# Patient Record
Sex: FEMALE | Race: WHITE
[De-identification: names, ages, dates, MRNs, and addresses within clinical notes are randomized per-mention and may not be internally consistent; named-entity substitution may affect disease eponyms.]

---

## 2019-09-16 ENCOUNTER — HOSPITAL ENCOUNTER (OUTPATIENT)
Dept: HOSPITAL 62 - RAD | Age: 69
End: 2019-09-16
Attending: NURSE PRACTITIONER
Payer: MEDICARE

## 2019-09-16 DIAGNOSIS — R20.0: Primary | ICD-10-CM

## 2019-09-16 PROCEDURE — 72148 MRI LUMBAR SPINE W/O DYE: CPT

## 2019-09-16 NOTE — RADIOLOGY REPORT (SQ)
EXAM DESCRIPTION:  MRI LUMBAR SPINE WITHOUT



COMPLETED DATE/TIME:  9/16/2019 8:04 am



REASON FOR STUDY:  NUMBNESS OF TOES R20.0  ANESTHESIA OF SKIN



COMPARISON:  None.



TECHNIQUE:  Sagittal and Axial imaging includes T1, T2, STIR and gradient echo sequences. Coronal T2/
HASTE imaging.



LIMITATIONS:  None.



FINDINGS:  VISUALIZED UPPER ABDOMEN:  Limited evaluation. No acute or suspicious findings suggested.

SEGMENTATION: There appears to be lumbarization of S1.  Consider radiographs to confirm this.

ALIGNMENT: There is grade 1 anterolisthesis of L4 on L5 and of L5 on S1.

VERTEBRAE: Intact.

BONE MARROW: The bone marrow is somewhat heterogeneous at L4 and L5.

DISC SIGNAL: There is disc narrowing at L4-5 and L5-S1.  The discs demonstrate decreased signal inten
sity.

POSTERIOR ELEMENTS:  Generally intact.  No pars defect evident.

HARDWARE: None in the spine.

CORD AND CONUS: Normal in size and signal intensity. Conus at the L1-2 level.

SOFT TISSUES: No aortic aneurysm seen. No bulky retroperitoneal adenopathy or mass. No paraspinal mas
s or fluid.

L1-L2: No significant spinal stenosis or exit foraminal stenosis.

L2-L3: No significant spinal stenosis or exit foraminal stenosis.

L3-L4: Mild concentric disc bulging with no central canal or foraminal stenosis.

L4-L5: There is unroofing of the disc secondary to the anterolisthesis.  There is shallow broad-based
 disc bulge.  Mild facet and ligament hypertrophy.  These findings result in mild central canal steno
sis.  There is no significant foraminal stenosis.

L5-S1: No significant spinal stenosis or exit foraminal stenosis.

LOWER THORACIC: Incompletely imaged.  No stenosis seen.

SACRUM: Visualized upper sacrum intact.

OTHER: No other significant findings.



IMPRESSION:  1.  There is grade 1 anterolisthesis of L4 on L5 an there is grade 1 anterolisthesis of 
L5 on S1.

2.  S1 may represent transitional vertebra.  Consider radiographs.

3.  The marrow signal is heterogeneous at L4 and L5 and in the ilium and sacrum.  Is there a history 
of neoplasm?  Cannot entirely exclude metastases.

4.  There is mild central canal stenosis at L4-5 secondary to the anterolisthesis and shallow broad-b
ased disc bulge.  There is no foraminal stenosis.



TECHNICAL DOCUMENTATION:  JOB ID:  [id number]

 [employer]- All Rights Reserved



Reading location - IP/workstation name: [email or web address]

## 2019-10-07 ENCOUNTER — HOSPITAL ENCOUNTER (OUTPATIENT)
Dept: HOSPITAL 62 - RAD | Age: 69
End: 2019-10-07
Attending: INTERNAL MEDICINE
Payer: MEDICARE

## 2019-10-07 DIAGNOSIS — R22.43: ICD-10-CM

## 2019-10-07 DIAGNOSIS — D47.2: Primary | ICD-10-CM

## 2019-10-07 PROCEDURE — 77075 RADEX OSSEOUS SURVEY COMPL: CPT

## 2019-10-07 NOTE — RADIOLOGY REPORT (SQ)
EXAM DESCRIPTION:  BONE SURVEY COMPLETE



COMPLETED DATE/TIME:  10/7/2019 3:23 pm



REASON FOR STUDY:  D49.2 NEOPLASM OF UNSP BEHAVIOR OF BONE, SOFT TISSUE, AND SKIN D49.2  NEOPLASM OF 
UNSP BEHAVIOR OF BONE, SOFT TISSUE, AND SK D47.2  MONOCLONAL GAMMOPATHY R22.43  LOCALIZED SWELLING, M
ASS AND LUMP, LOWER LIMB, BILATE



COMPARISON:  None.



TECHNIQUE:  Images of the axial and proximal appendicular skeleton are obtained, along with lateral s
kull and frontal chest films.



LIMITATIONS:  None.



FINDINGS:  AP CHEST: No bony findings.  Lungs are clear.

LATERAL SKULL: There is a small lytic lesion in the parietal region of the calvarium.

AP BOTH HUMERI: No worrisome bone lesions.

TWO-VIEW LUMBAR SPINE: No worrisome bone lesions.  Anterolistheses of what appears to be L3 on L4 and
 of L4 on L5.

TWO-VIEW THORACIC SPINE: No worrisome bone lesions.

AP PELVIS: No worrisome bone lesions.

AP BOTH FEMURS: No worrisome bone lesions.

OTHER: No other significant finding.



IMPRESSION:  There is a small lytic lesion in the parietal calvarium.  This is seen on both views, bu
t is felt to represent a single lesion.  No other significant findings.



TECHNICAL DOCUMENTATION:  JOB ID:  [id number]

 2011 [employer]- All Rights Reserved



Reading location - IP/workstation name: [email or web address]

## 2019-10-09 ENCOUNTER — HOSPITAL ENCOUNTER (OUTPATIENT)
Dept: HOSPITAL 62 - RAD | Age: 69
End: 2019-10-09
Attending: INTERNAL MEDICINE
Payer: MEDICARE

## 2019-10-09 DIAGNOSIS — D47.2: ICD-10-CM

## 2019-10-09 DIAGNOSIS — M79.89: Primary | ICD-10-CM

## 2019-10-09 DIAGNOSIS — R22.43: ICD-10-CM

## 2019-10-09 PROCEDURE — 93970 EXTREMITY STUDY: CPT

## 2019-10-09 PROCEDURE — A9561 TC99M OXIDRONATE: HCPCS

## 2019-10-09 PROCEDURE — 78306 BONE IMAGING WHOLE BODY: CPT

## 2019-10-09 NOTE — RADIOLOGY REPORT (SQ)
EXAM DESCRIPTION:  NM WHOLE BODY BONE SCAN



COMPLETED DATE/TIME:  10/9/2019 12:50 pm



REASON FOR STUDY:  OTHER SPECIFIED SOFT TISSUE  DISORDERS D47.2 MONOCLONAL GAMMOPATHY M79.89  OTHER S
PECIFIED SOFT TISSUE DISORDERS D49.2  NEOPLASM OF UNSP BEHAVIOR OF BONE, SOFT TISSUE, AND SK R22.43  
LOCALIZED SWELLING, MASS AND LUMP, LOWER LIMB, BILATE



COMPARISON:  Bone survey 10/7/2019 MR lumbar spine 9/16/2019



RADIONUCLIDE AND DOSE:  20 millicuries Tc99m HDP.

The route of agent administration: Intravenous.



ADDITIONAL DRUGS AND DOSES:  None.



TECHNIQUE:  Routine delayed images at 3 hours post radionuclide injection acquired of the bony skelet
on including anterior and posterior whole-body projections and additional focused images as needed.



LIMITATIONS:  None.



FINDINGS:  BONES: There is mild increased uptake in the left side of the mid cervical spine and in th
e region of the facet joints in the lower lumbar spine felt to be degenerative.  No other significant
 abnormal uptake is seen.

KIDNEYS: Symmetric excretion without obstruction.

OTHER: No other significant finding.



IMPRESSION:  Mild degenerative uptake.  No evidence of skeletal metastases.



COMMENT:  Quality measure 147:  Current bone scan is compared with any available plain radiographs, p
rior bone scans, and CT/MRI.



TECHNICAL DOCUMENTATION:  JOB ID:  [id number]

 2011 [employer]- All Rights Reserved



Reading location - IP/workstation name: [email or web address]

## 2019-10-09 NOTE — XCELERA REPORT
[address]

                             [facility] [address]

                               Tel: [phone]

                               Fax: [phone]



                       Lower Extremity Venous Evaluation

_______________________________________________________________________________



_______________________________________________________________________________

Procedure: Color flow and duplex imaging bilaterally of the veins of the lower

extremities as well as the Common Femoral veins.





_______________________________________________________________________________



_______________________________________________________________________________



Right Sided Venous Evaluation

Normal vessel filling wall to wall, compression and augmentation as well as

Colour flow down to the infrageniculate veins.



Left Sided Venous Evaluation

Normal vessel filling wall to wall, compression and augmentation as well as

Colour flow down to the infrageniculate veins.



_______________________________________________________________________________



Interpretation Summary

No duplex evidence of DVT or obstruction in the bilateral lower extremities.

_______________________________________________________________________________



Name: MARCY FOOTE

MRN: [id number]

Age: 68 yrs

Gender: Female                                       : 1950

Patient Status: Preadmit                             Patient Location: [facility]

Account #: [id number]

Study Date: 10/09/2019 01:29 PM

                        Accession #: [id number]

Reason For Study: SWELLING

Ordering Physician: DUDLEY PEREZ

Performed By: Marbella Emerson

Electronically signed by:      Lennox Williams      on 10/09/2019 04:16 PM



CC: DUDLEY PEREZ

>

Williams, Lennox

## 2019-10-11 ENCOUNTER — HOSPITAL ENCOUNTER (OUTPATIENT)
Dept: HOSPITAL 62 - RAD | Age: 69
End: 2019-10-11
Attending: INTERNAL MEDICINE
Payer: MEDICARE

## 2019-10-11 DIAGNOSIS — Z87.891: ICD-10-CM

## 2019-10-11 DIAGNOSIS — Z12.2: Primary | ICD-10-CM

## 2019-10-11 DIAGNOSIS — R91.8: ICD-10-CM

## 2019-10-11 PROCEDURE — G0297 LDCT FOR LUNG CA SCREEN: HCPCS

## 2019-10-11 NOTE — RADIOLOGY REPORT (SQ)
EXAM DESCRIPTION:  CT LUNG CANCER SCREENING



COMPLETED DATE/TIME:  10/11/2019 8:17 am



REASON FOR STUDY:  (Z87.891)PERSONAL HISTORY OF NICOTINE DEPENDENCE Z87.891  PERSONAL HISTORY OF FLOR
ESME DEPENDENCE

Low-dose CT lung cancer screening.  Please see technologist documentation for complete screening info
rmation.



COMPARISON:  None.



TECHNIQUE:  Low Dose CT scan performed of the chest without intravenous contrast for purposes of scre
ening for lung cancer.  Images reviewed with lung, soft tissue and bone windows.  Reconstructed coron
al and sagittal MPR images reviewed.  All images stored on PACS.

All CT scanners at this facility use dose modulation, iterative reconstruction, and/or weight based d
osing when appropriate to reduce radiation dose to as low as reasonably achievable (ALARA).

[facility]: Dose Right  [facility]: CareDose    [facility]: Dose Right    [facility]: Teradose 4D    [facility]: Smart MemSQL



RADIATION DOSE:  CT Rad equipment meets quality standard of care and radiation dose reduction techniq
ues were employed. CTDIvol: NaN mGy. DLP: 0 mGy-cm.  mGy.

 .



LIMITATIONS:  None



FINDINGS:  LUNGS AND PLEURA: Occasional tiny nodules, for example a 2 mm nodule in the left lower lob
e (series 3, image 122).  No pleural effusions or calcifications.  No pneumothorax.   No scarring or 
interstitial changes.

HILAR AND MEDIASTINAL STRUCTURES: No identified masses. No abnormal nodes.

HEART AND VASCULAR STRUCTURES: No aortic aneurysm.  No pericardial effusion.   No cardiac devices.

CORONARY ARTERY CALCIFICATIONS: No significant calcifications.

UPPER ABDOMEN, THYROID, BONES, OTHER SOFT TISSUES: No significant findings.



IMPRESSION:  No suspicious pulmonary nodules.  Recommend return to annual CT screening.



LUNGRADS:  LUNGRADS:  2 BENIGN APPEARANCE OR BEHAVIOR.

MODIFIER: NONE



RECOMMENDATION:  Continue annual screening with LDCT in 12 months.



COMMENT:  CRITERIA:

Nodules smaller than 6 mm in size at baseline.

Nodules with specific calcifications:  Complete, central, popcorn, concentric rings and fat containin
g nodules.



TECHNICAL DOCUMENTATION:  JOB ID:  [id number]

Quality ID # 436: Final reports with documentation of one or more dose reduction techniques (e.g., Au
tomated exposure control, adjustment of the mA and/or kV according to patient size, use of iterative 
reconstruction technique)

 2011 [facility]



Reading location - IP/workstation name: [email or web address]

## 2019-11-20 ENCOUNTER — HOSPITAL ENCOUNTER (OUTPATIENT)
Dept: HOSPITAL 62 - RAD | Age: 69
Discharge: HOME | End: 2019-11-20
Attending: INTERNAL MEDICINE
Payer: MEDICARE

## 2019-11-20 VITALS — DIASTOLIC BLOOD PRESSURE: 68 MMHG | SYSTOLIC BLOOD PRESSURE: 107 MMHG

## 2019-11-20 DIAGNOSIS — D47.2: Primary | ICD-10-CM

## 2019-11-20 DIAGNOSIS — D64.9: ICD-10-CM

## 2019-11-20 LAB
APTT BLD: 26.6 SEC (ref 23.5–35.8)
BUN SERPL-MCNC: 20 MG/DL (ref 7–20)
ERYTHROCYTE [DISTWIDTH] IN BLOOD BY AUTOMATED COUNT: 13.2 % (ref 11.5–14)
HCT VFR BLD CALC: 39.1 % (ref 36–47)
HGB BLD-MCNC: 13.7 G/DL (ref 12–15.5)
INR PPP: 1
MCH RBC QN AUTO: 31.9 PG (ref 27–33.4)
MCHC RBC AUTO-ENTMCNC: 35 G/DL (ref 32–36)
MCV RBC AUTO: 91 FL (ref 80–97)
PLATELET # BLD: 148 10^3/UL (ref 150–450)
PROTHROMBIN TIME: 13.2 SEC (ref 11.4–15.4)
RBC # BLD AUTO: 4.29 10^6/UL (ref 3.72–5.28)
WBC # BLD AUTO: 3.8 10^3/UL (ref 4–10.5)

## 2019-11-20 PROCEDURE — 84520 ASSAY OF UREA NITROGEN: CPT

## 2019-11-20 PROCEDURE — 85610 PROTHROMBIN TIME: CPT

## 2019-11-20 PROCEDURE — 85730 THROMBOPLASTIN TIME PARTIAL: CPT

## 2019-11-20 PROCEDURE — 36415 COLL VENOUS BLD VENIPUNCTURE: CPT

## 2019-11-20 PROCEDURE — 85027 COMPLETE CBC AUTOMATED: CPT

## 2019-11-20 PROCEDURE — 82565 ASSAY OF CREATININE: CPT

## 2019-11-20 PROCEDURE — 38221 DX BONE MARROW BIOPSIES: CPT

## 2019-11-20 PROCEDURE — 77012 CT SCAN FOR NEEDLE BIOPSY: CPT

## 2019-11-20 NOTE — RADIOLOGY REPORT (SQ)
EXAM DESCRIPTION:  CT BIOPSY BONE MARROW, NEEDLE; CT NEEDLE PLACEMENT



COMPLETED DATE/TIME:  11/20/2019 11:24 am



REASON FOR STUDY:  MONOCLONAL GAMMOPATHY/ ANEMIA, UNSPECIFIED D47.2  MONOCLONAL GAMMOPATHY D64.9  ANE
LENCHO, UNSPECIFIED



COMPARISON:  None.



TECHNIQUE:  The procedure, risks, benefits, and alternatives were discussed with the patient in the p
reprocedural area, and all questions were answered. Informed consent was obtained verbally and in wri
ting.

The patient was then brought to the CT suite, positioned prone on the CT gurney, and a time-out was p
erformed. After that, axial images of the pelvis were obtained for targeting of the iliac tuberosity.
 Based on review of the axial images an appropriate access site was selected on the left buttock.

The area around the selected access site was then prepped and draped 2% chlorhexidine utilizing stand
chang sterile technique. After that, the access site was infiltrated 1% lidocaine and a skin incision w
as made with a #11 blade. An 11 gauge Osteo-Site Bishop coaxial needle was then advanced into the alessandro
ac tuberosity. After that, the inner stylet of the coaxial needle was replaced for a 13 gauge biopsy 
needle and 1 core sample was obtained. 20 mL of marrow aspirate were then collected through the cannu
la of the coaxial needle. Once the aspirate was collected, the cannula of the Osteo-Site Bishop coaxi
al needle was removed and axial images of the biopsied area were obtained to exclude an acute complic
ation.

The patient tolerated the procedure well without immediate complication.

At the end of the procedure the patient's condition was unchanged from the preprocedural baseline.

IV conscious sedation was administered at the direction of the performing physician by a dustin payan. 1 milligrams of Versed and 50 micrograms of fentanyl. Physiologic monitoring was provided befor
e, during, and after sedation. The total sedation time was 30 minutes.

Documentation of face-to-face time performing proceduralist spent monitoring the patient: 15 minutes.




RADIATION DOSE:  .06 mGy cm.



FINDINGS:  Integrated into Technique.



IMPRESSION:  CT GUIDED ASPIRATE AND CORE BIOPSY OF THE LEFT POSTERIOR ILIAC CREST BONE MARROW PERFORM
ED WITHOUT IMMEDIATE COMPLICATION.  PATHOLOGY PENDING.

IV CONSCIOUS SEDATION WITHOUT COMPLICATION.



COMMENT:  Quality :  Final reports for procedures using fluoroscopy that document radiation exp
osure indices, or exposure time and number of fluorographic images (if radiation exposure indices are
 not available)

Patient medication list reviewed: Yes- Quality ID# 130:Eligible professional attests to documenting i
n the medical record they obtained, updated, or reviewed the patient's current medications..



TECHNICAL DOCUMENTATION:  JOB ID:  [id number]

Quality ID# 436: Final reports with documentation of one or more dose reduction techniques (e.g., Aut
omated exposure control, adjustment of the mA and/or kV according to patient size, use of iterative r
econstruction technique)

 2011 [employer]- All Rights Reserved



Reading location - IP/workstation name: [email or web address]

## 2019-11-20 NOTE — RADIOLOGY REPORT (SQ)
EXAM DESCRIPTION:  CT BIOPSY BONE MARROW, NEEDLE; CT NEEDLE PLACEMENT



COMPLETED DATE/TIME:  11/20/2019 11:24 am



REASON FOR STUDY:  MONOCLONAL GAMMOPATHY/ ANEMIA, UNSPECIFIED D47.2  MONOCLONAL GAMMOPATHY D64.9  ANE
LENCHO, UNSPECIFIED



COMPARISON:  None.



TECHNIQUE:  The procedure, risks, benefits, and alternatives were discussed with the patient in the p
reprocedural area, and all questions were answered. Informed consent was obtained verbally and in wri
ting.

The patient was then brought to the CT suite, positioned prone on the CT gurney, and a time-out was p
erformed. After that, axial images of the pelvis were obtained for targeting of the iliac tuberosity.
 Based on review of the axial images an appropriate access site was selected on the left buttock.

The area around the selected access site was then prepped and draped 2% chlorhexidine utilizing stand
chang sterile technique. After that, the access site was infiltrated 1% lidocaine and a skin incision w
as made with a #11 blade. An 11 gauge Osteo-Site Bishop coaxial needle was then advanced into the alessandro
ac tuberosity. After that, the inner stylet of the coaxial needle was replaced for a 13 gauge biopsy 
needle and 1 core sample was obtained. 20 mL of marrow aspirate were then collected through the cannu
la of the coaxial needle. Once the aspirate was collected, the cannula of the Osteo-Site Bishop coaxi
al needle was removed and axial images of the biopsied area were obtained to exclude an acute complic
ation.

The patient tolerated the procedure well without immediate complication.

At the end of the procedure the patient's condition was unchanged from the preprocedural baseline.

IV conscious sedation was administered at the direction of the performing physician by a dustin payan. 1 milligrams of Versed and 50 micrograms of fentanyl. Physiologic monitoring was provided befor
e, during, and after sedation. The total sedation time was 30 minutes.

Documentation of face-to-face time performing proceduralist spent monitoring the patient: 15 minutes.




RADIATION DOSE:  .06 mGy cm.



FINDINGS:  Integrated into Technique.



IMPRESSION:  CT GUIDED ASPIRATE AND CORE BIOPSY OF THE LEFT POSTERIOR ILIAC CREST BONE MARROW PERFORM
ED WITHOUT IMMEDIATE COMPLICATION.  PATHOLOGY PENDING.

IV CONSCIOUS SEDATION WITHOUT COMPLICATION.



COMMENT:  Quality :  Final reports for procedures using fluoroscopy that document radiation exp
osure indices, or exposure time and number of fluorographic images (if radiation exposure indices are
 not available)

Patient medication list reviewed: Yes- Quality ID# 130:Eligible professional attests to documenting i
n the medical record they obtained, updated, or reviewed the patient's current medications..



TECHNICAL DOCUMENTATION:  JOB ID:  [id number]

Quality ID# 436: Final reports with documentation of one or more dose reduction techniques (e.g., Aut
omated exposure control, adjustment of the mA and/or kV according to patient size, use of iterative r
econstruction technique)

 2011 [employer]- All Rights Reserved



Reading location - IP/workstation name: [email or web address]

## 2020-01-23 ENCOUNTER — HOSPITAL ENCOUNTER (EMERGENCY)
Dept: HOSPITAL 62 - ER | Age: 70
Discharge: HOME | End: 2020-01-23
Payer: MEDICARE

## 2020-01-23 VITALS — SYSTOLIC BLOOD PRESSURE: 115 MMHG | DIASTOLIC BLOOD PRESSURE: 72 MMHG

## 2020-01-23 DIAGNOSIS — F17.200: ICD-10-CM

## 2020-01-23 DIAGNOSIS — M54.2: ICD-10-CM

## 2020-01-23 DIAGNOSIS — M47.9: Primary | ICD-10-CM

## 2020-01-23 DIAGNOSIS — R20.2: ICD-10-CM

## 2020-01-23 DIAGNOSIS — R51: ICD-10-CM

## 2020-01-23 PROCEDURE — 72125 CT NECK SPINE W/O DYE: CPT

## 2020-01-23 PROCEDURE — S0119 ONDANSETRON 4 MG: HCPCS

## 2020-01-23 PROCEDURE — 99283 EMERGENCY DEPT VISIT LOW MDM: CPT

## 2020-01-23 NOTE — ER DOCUMENT REPORT
HPI





- HPI


Time Seen by Provider: 01/23/20 03:36


Pain Level: 5


Context: 


Patient is a 69-year-old female that comes emergency department for chief 

complaint of neck pain.  Pain is worse on the right side of the neck.  She 

states she has gotten a vague headache at times at the day as well but initially

it started as tightness and pain in the neck along.  She states that she has had

worsening symptoms for the past 2 days, she states yesterday she did help her 

 get up after he had fallen and she did have to pull on him somewhat 

hard.  She denies impact injury.  She states that intermittently she will get 

tingling sensation in her right arm but this is common and has been going on for

a long time.  She denies any numbness, weakness, chest pain, shortness of 

breath, fever, current headache, nausea or vomiting.  She is not on a blood 

thinner.  She denies taking any daily medications or having any diagnosed 

medical history.








Past Medical History





- General


Information source: Patient





- Social History


Smoking Status: Current Every Day Smoker


Lives with: Family


Family History: Reviewed & Not Pertinent


Patient has suicidal ideation: No


Patient has homicidal ideation: No





- Past Medical History


Cardiac Medical History: 


   Denies: Hx Coronary Artery Disease, Hx Heart Attack, Hx Hypertension


Pulmonary Medical History: 


   Denies: Hx Asthma, Hx Bronchitis, Hx COPD, Hx Pneumonia


Neurological Medical History: Denies: Hx Cerebrovascular Accident, Hx Seizures


Musculoskeletal Medical History: Denies Hx Arthritis - PT STATES HAS LOST 

FLEXIBILITY IN LEGS





- Immunizations


Hx Diphtheria, Pertussis, Tetanus Vaccination: Yes





Vertical Provider Document





- CONSTITUTIONAL


General Appearance: WD/WN, No Apparent Distress





- INFECTION CONTROL


TRAVEL OUTSIDE OF THE [place] IN LAST 30 DAYS: No





- HEENT


HEENT: Atraumatic, Normal ENT Exam, Normocephalic





- NECK


Neck: Normal Inspection





- RESPIRATORY


Respiratory: Breath Sounds Normal, No Respiratory Distress





- CARDIOVASCULAR


Cardiovascular: Regular Rate, Regular Rhythm





- GI/ABDOMEN


Gastrointestinal: Abdomen Soft, Abdomen Non-Tender





- BACK


Back: negative: Normal Inspection - Patient with very limited range of motion of

the neck laterally.  She has bunched up muscle fibers in the paracervical area 

at the base of the skull on the right.  This is very specific and reproducible. 

No midline tenderness, no saddle anesthesia, no signs of trauma. Normal upper 

and lower extremity range of motion, normal strength, normal distal 

neurovascular exam.





- MUSCULOSKELETAL/EXTREMETIES


Musculoskeletal/Extremeties: MJ, FROM, Non-Tender





- NEURO


Level of Consciousness: Awake, Alert, Appropriate


Motor/Sensory: No Motor Deficit, No Sensory Deficit





- DERM


Integumentary: Warm, Dry, No Rash





Course





- Re-evaluation


Re-evalutation: 


CAT scan imaging of the neck shows degenerative changes without acute findings. 

Patient has no neurological deficits.  She has no current headache.  She has no 

trauma.  She did have a pulling injury which likely is exacerbated what appears 

to be muscle spasm in the neck.  Patient has very limited range of motion 

laterally of the neck and has point tenderness over the muscle spasm.  Patient 

states she cannot even sleep because of the tightness and she is miserable.  As 

result she was provided with low-dose diazepam to help treat this, discussed 

precautions with this because of her age, discussed close follow-up with primary

care and return precautions, provided with copy of her report on request.  

Patient is here with her .  They state appreciation and agreement.  

Stable at time of discharge.





- Vital Signs


Vital signs: 


                                        











Temp Pulse Resp BP Pulse Ox


 


 97.9 F   66   18   119/77   96 


 


 01/23/20 00:39  01/23/20 00:39  01/23/20 00:39  01/23/20 00:39  01/23/20 00:39














Discharge





- Discharge


Clinical Impression: 


 Neck pain





Condition: Stable


Disposition: HOME, SELF-CARE


Additional Instructions: 


Your evaluation is consistent with a muscle spasm.  Your imaging shows 

degenerative changes in the neck but no obvious concerning acute findings.  I 

recommend heat to the area, gentle stretches, you can take over-the-counter 

medication for pain, I also recommend the prescribed muscle relaxer.  You can 

take 1/2 a tablet to start with, you can proceed to full dose if well tolerated.

 Do not drive, drink alcohol, mix with sedating medication while taking this. 





Please follow-up closely with your primary care provider for additional 

management.





Come back if you are worse including developing numbness, severe headache, 

vomiting, or any other concerning or worsening symptoms.


Prescriptions: 


Diazepam [Valium 5 mg Tablet] 0.5 - 1 tab PO TID PRN #12 tablet


 PRN Reason: 


Referrals: 


BANDAR ANGULO, NP [Primary Care Provider] - Follow up as needed

## 2020-01-23 NOTE — RADIOLOGY REPORT (SQ)
EXAM DESCRIPTION: 



CT CERVICAL SPINE WITHOUT IV CONTRAST



COMPLETED DATE/TME:  01/23/2020 03:56



CLINICAL HISTORY: neck pain, right hand numbness, can't rotate

neck



COMPARISON: None available



TECHNIQUE: Axial CT of the cervical spine obtained without

contrast.



FINDINGS: 

Straightening of the cervical lordosis may be secondary to

patient positioning.  The atlantoaxial, atlantodental, and

occipitoatlantal intervals are preserved.  No fracture

identified. Vertebral body height preserved.  Prevertebral soft

tissues are unremarkable.



Multilevel loss of intervertebral disc height with endplate

spondylosis, cerebellum, and uncovertebral spurring.



Visualized skull base is intact.  No fracture of the visualized

facial bones. Visualized mastoid air cells and paranasal sinuses

are well aerated.



Visualized thyroid is unremarkable.  No cervical lymphadenopathy.

No pneumothorax in the visualized lung apices. Atherosclerotic

calcification







IMPRESSION:

1.  No acute fracture or subluxation of the cervical spine.



2.  Moderate to severe multilevel degenerative change of the

cervical spine.



This exam was performed according to our departmental

dose-optimization program, which includes automated exposure

control, adjustment of the mA and/or kV according to patient size

and/or use of iterative reconstruction technique.

## 2020-07-09 ENCOUNTER — HOSPITAL ENCOUNTER (EMERGENCY)
Dept: HOSPITAL 62 - ER | Age: 70
Discharge: HOME | End: 2020-07-09
Payer: MEDICARE

## 2020-07-09 VITALS — SYSTOLIC BLOOD PRESSURE: 135 MMHG | DIASTOLIC BLOOD PRESSURE: 68 MMHG
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
## 2020-07-09 DIAGNOSIS — S62.101A: Primary | ICD-10-CM

## 2020-07-09 DIAGNOSIS — M79.601: ICD-10-CM

## 2020-07-09 DIAGNOSIS — R20.0: ICD-10-CM

## 2020-07-09 PROCEDURE — 2W3CX1Z IMMOBILIZATION OF RIGHT LOWER ARM USING SPLINT: ICD-10-PCS

## 2020-07-09 PROCEDURE — 73130 X-RAY EXAM OF HAND: CPT

## 2020-07-09 PROCEDURE — 29125 APPL SHORT ARM SPLINT STATIC: CPT

## 2020-07-09 PROCEDURE — 96374 THER/PROPH/DIAG INJ IV PUSH: CPT

## 2020-07-09 PROCEDURE — 73060 X-RAY EXAM OF HUMERUS: CPT

## 2020-07-09 PROCEDURE — 99283 EMERGENCY DEPT VISIT LOW MDM: CPT

## 2020-07-09 PROCEDURE — 96375 TX/PRO/DX INJ NEW DRUG ADDON: CPT

## 2020-07-09 PROCEDURE — 73090 X-RAY EXAM OF FOREARM: CPT

## 2020-07-09 NOTE — RADIOLOGY REPORT (SQ)
EXAM DESCRIPTION:  FOREARM RIGHT



IMAGES COMPLETED DATE/TIME:  7/9/2020 5:08 pm



REASON FOR STUDY:  right arm pain



COMPARISON:  None.



NUMBER OF VIEWS:  Two views.



TECHNIQUE:  Two radiographic images acquired of the right forearm, including elbow and wrist in at le
ast one projection.



LIMITATIONS:  None.



FINDINGS:  MINERALIZATION: Normal.

BONES: Acute comminuted intra-articular fracture distal right radius metaphysis with dorsal subluxati
on of distal fracture fragments.  Dorsal angulation of the right distal radius articular surface.

Carpal bones, proximal metacarpals, distal ulna intact.

Limited view of the distal humerus in the field of view, remainder the radius and ulna proximally are
 intact.

SOFT TISSUES: Diffuse right wrist soft tissue swelling

OTHER: No other significant finding.



IMPRESSION:  Acute comminuted intra-articular fracture distal right radius metaphysis with dorsal sub
luxation and angulation of distal fracture fragments.



TECHNICAL DOCUMENTATION:  JOB ID:  [id number]

 2011 [employer]- All Rights Reserved



Reading location - IP/workstation name: [email or web address]

## 2020-07-09 NOTE — RADIOLOGY REPORT (SQ)
EXAM DESCRIPTION:  HUMERUS RIGHT



IMAGES COMPLETED DATE/TIME:  7/9/2020 5:25 pm



REASON FOR STUDY:  pain



COMPARISON:  Right forearm two views same date



NUMBER OF VIEWS:  Two views.



TECHNIQUE:  Two radiographic images were acquired of the right humerus to include elbow and shoulder 
in at least one projection.



LIMITATIONS:  None.



FINDINGS:  MINERALIZATION: Osteopenic

BONES: No acute fracture or dislocation.  No worrisome bone lesions.

SOFT TISSUES: No obvious swelling or foreign body.

OTHER: No other significant finding.



IMPRESSION:  NEGATIVE STUDY OF THE RIGHT HUMERUS. NO RADIOGRAPHIC EVIDENCE OF ACUTE INJURY.



TECHNICAL DOCUMENTATION:  JOB ID:  [id number]

 2011 [employer]- All Rights Reserved



Reading location - IP/workstation name: [id number]

## 2020-07-09 NOTE — RADIOLOGY REPORT (SQ)
EXAM DESCRIPTION:  HAND RIGHT 3 VIEWS



IMAGES COMPLETED DATE/TIME:  7/9/2020 5:08 pm



REASON FOR STUDY:  right arm pain



COMPARISON:  Right forearm two views same



EXAM PARAMETERS:  NUMBER OF VIEWS: Three views.

TECHNIQUE: AP, lateral and oblique  radiographic images acquired of the right hand.

LIMITATIONS: None.



FINDINGS:  MINERALIZATION: Normal.

BONES: Acute comminuted intra-articular fracture distal right radius metaphysis with significant dors
al subluxation and angulation of distal fracture fragments.

No acute distal ulna or carpal bone/proximal metacarpal fracture.  Remainder of the bones of the righ
t hand are intact.

JOINTS: Diffuse wrist joint effusions.

SOFT TISSUES: Diffuse wrist soft tissue swelling.  No foreign body.

OTHER: No other significant finding.



IMPRESSION:  Acute comminuted intra-articular fracture, distal right radius metaphysis with significa
nt dorsal subluxation and angulation at the fracture site



TECHNICAL DOCUMENTATION:  JOB ID:  [id number]

 2011 [employer]- All Rights Reserved



Reading location - IP/workstation name: [id number]

## 2020-07-09 NOTE — ER DOCUMENT REPORT
ED General





- General


Chief Complaint: Wrist Injury


Stated Complaint: RIGHT ARM PAIN


Time Seen by Provider: 07/09/20 16:51


Primary Care Provider: 


BANDAR ANGULO NP [Primary Care Provider] - Follow up as needed


Mode of Arrival: Ambulatory


Information source: Patient


Notes: 


69-year-old white female arrives with chief complaint of right wrist pain after 

he tripped and fell striking a stove deforming her right distal wrist.  Patient 

also injured her right proximal shoulder when this occurred.  She denies any 

other injury.  She reports prior fracture to her right forearm when she was in 

the sixth grade which took 2 months to heal in [place].. She also had a 

fracture of her left thumb and status post splinting for this.


TRAVEL OUTSIDE OF THE [place] IN LAST 30 DAYS: No





- HPI


Onset: This afternoon


Quality of pain: Achy


Severity: Moderate


Pain Level: 2


Exacerbated by: Movement


Relieved by: Remaining still


Similar symptoms previously: No


Recently seen / treated by doctor: No





- Related Data


Allergies/Adverse Reactions: 


                                        





No Known Allergies Allergy (Verified 07/09/20 16:29)


   











Past Medical History





- General


Information source: Patient





- Social History


Smoking Status: Unknown if Ever Smoked


Cigarette use (# per day): No


Chew tobacco use (# tins/day): No


Smoking Education Provided: No


Frequency of alcohol use: None


Drug Abuse: None


Lives with: Family


Family History: Reviewed & Not Pertinent


Patient has suicidal ideation: No


Patient has homicidal ideation: No





- Past Medical History


Cardiac Medical History: 


   Denies: Hx Coronary Artery Disease, Hx Heart Attack, Hx Hypertension


Pulmonary Medical History: 


   Denies: Hx Asthma, Hx Bronchitis, Hx COPD, Hx Pneumonia


Neurological Medical History: Denies: Hx Cerebrovascular Accident, Hx Seizures


Musculoskeletal Medical History: Denies Hx Arthritis - PT STATES HAS LOST 

FLEXIBILITY IN LEGS


Past Surgical History: Reports: Hx Cholecystectomy, Hx Tonsillectomy





- Immunizations


Hx Diphtheria, Pertussis, Tetanus Vaccination: Yes





Physical Exam





- Vital signs


Vitals: 


                                        











Temp Pulse Resp BP Pulse Ox


 


 97.6 F   62   20   161/79 H  100 


 


 07/09/20 16:26  07/09/20 16:26  07/09/20 16:26  07/09/20 16:26  07/09/20 16:26














Course





- Vital Signs


Vital signs: 


                                        











Temp Pulse Resp BP Pulse Ox


 


 98.6 F   62   20   161/79 H  100 


 


 07/09/20 16:29  07/09/20 16:26  07/09/20 16:26  07/09/20 16:26  07/09/20 16:26














- Diagnostic Test


Radiology reviewed: Reports reviewed





Critical Care Note





- Critical Care Note


Total time excluding time spent on procedures (mins): 60


Comments: 





Volar splint applied by staff and was initially complaining of pointer middle 

finger numbness but after volar splint was applied only right thumb was numb.  I

called Dr. Abdirashid Vásquez and advised him of this complaint.  Patient has distal 

radial fracture.. Dr. Vásquez advises numbness is probably from nerve contusion 

on placement of the splint.  He advises continuing splint and call his office in

the morning in order to be evaluated in office.  Continue to keep splint on and 

sling on until seen by Dr. Vásquez





Discharge





- Discharge


Clinical Impression: 


Wrist fracture, right


Qualifiers:


 Encounter type: initial encounter Fracture type: closed Qualified Code(s): 

S62.101A - Fracture of unspecified carpal bone, right wrist, initial encounter 

for closed fracture





Condition: Good


Disposition: HOME, SELF-CARE


Additional Instructions: 


Call Dr. Vásquez's office in the morning in order to be evaluated by Dr. Vásquez. 

Keep sling and splint on.  May unwrap and loosen as needed for sensation 

problems and for numbness and pain.  Try to keep the wrist on the splint itself.

 When you squeeze your fingers they should return to a pinkness within 2 seconds

if this does not occur return to the ER.


Prescriptions: 


Oxycodone HCl/Acetaminophen [Percocet 5-325 mg Tablet] 1 tab PO Q4H PRN #15 

tablet


 PRN Reason: Pain Scale Of 1


Oxycodone HCl/Acetaminophen [Percocet 5-325 mg Tablet] 1 tab PO QID #15 tablet


Referrals: 


BANDAR ANGULO, NP [Primary Care Provider] - Follow up as needed